# Patient Record
Sex: MALE | Race: WHITE | Employment: OTHER | URBAN - NONMETROPOLITAN AREA
[De-identification: names, ages, dates, MRNs, and addresses within clinical notes are randomized per-mention and may not be internally consistent; named-entity substitution may affect disease eponyms.]

---

## 2017-06-03 ENCOUNTER — NURSE TRIAGE (OUTPATIENT)
Dept: ADMINISTRATIVE | Age: 67
End: 2017-06-03

## 2017-08-24 ENCOUNTER — HOSPITAL ENCOUNTER (EMERGENCY)
Age: 67
Discharge: HOME | End: 2017-08-24
Attending: EMERGENCY MEDICINE
Payer: MEDICARE

## 2017-08-24 VITALS
OXYGEN SATURATION: 100 % | DIASTOLIC BLOOD PRESSURE: 76 MMHG | WEIGHT: 155 LBS | RESPIRATION RATE: 16 BRPM | HEART RATE: 68 BPM | SYSTOLIC BLOOD PRESSURE: 138 MMHG | BODY MASS INDEX: 22.89 KG/M2 | TEMPERATURE: 98.4 F

## 2017-08-24 DIAGNOSIS — L25.9 CONTACT DERMATITIS AND OTHER ECZEMA, DUE TO UNSPECIFIED CAUSE: Primary | ICD-10-CM
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
## 2017-08-24 PROCEDURE — 99213 OFFICE O/P EST LOW 20 MIN: CPT | Performed by: NURSE PRACTITIONER

## 2017-08-24 PROCEDURE — 99282 EMERGENCY DEPT VISIT SF MDM: CPT

## 2017-08-24 PROCEDURE — 99214 OFFICE O/P EST MOD 30 MIN: CPT

## 2017-08-24 RX ORDER — SULFAMETHOXAZOLE AND TRIMETHOPRIM 800; 160 MG/1; MG/1
1 TABLET ORAL 2 TIMES DAILY
Qty: 14 TABLET | Refills: 0 | Status: SHIPPED | OUTPATIENT
Start: 2017-08-24 | End: 2017-08-31

## 2017-08-24 RX ORDER — CEPHALEXIN 500 MG/1
500 CAPSULE ORAL 2 TIMES DAILY
Qty: 14 CAPSULE | Refills: 0 | Status: SHIPPED | OUTPATIENT
Start: 2017-08-24 | End: 2017-08-31

## 2017-08-24 ASSESSMENT — ENCOUNTER SYMPTOMS
COUGH: 0
SHORTNESS OF BREATH: 0
VOMITING: 0
CHEST TIGHTNESS: 0
NAUSEA: 0
DIARRHEA: 0
ABDOMINAL PAIN: 0

## 2017-08-24 ASSESSMENT — PAIN SCALES - GENERAL: PAINLEVEL_OUTOF10: 3

## 2017-08-24 ASSESSMENT — PAIN DESCRIPTION - FREQUENCY: FREQUENCY: CONTINUOUS

## 2017-08-24 ASSESSMENT — PAIN DESCRIPTION - PAIN TYPE: TYPE: ACUTE PAIN

## 2017-08-24 ASSESSMENT — PAIN DESCRIPTION - ORIENTATION: ORIENTATION: RIGHT;LOWER

## 2017-08-24 ASSESSMENT — PAIN DESCRIPTION - DESCRIPTORS: DESCRIPTORS: ACHING

## 2017-08-24 ASSESSMENT — PAIN DESCRIPTION - LOCATION: LOCATION: ARM

## 2017-12-09 ENCOUNTER — HOSPITAL ENCOUNTER (OUTPATIENT)
Age: 67
Setting detail: OBSERVATION
Discharge: HOME | End: 2017-12-10
Attending: EMERGENCY MEDICINE | Admitting: OPHTHALMOLOGY
Payer: MEDICARE

## 2017-12-09 ENCOUNTER — APPOINTMENT (OUTPATIENT)
Dept: CT IMAGING | Age: 67
End: 2017-12-09
Payer: MEDICARE

## 2017-12-09 DIAGNOSIS — K92.2 GASTROINTESTINAL HEMORRHAGE, UNSPECIFIED GASTROINTESTINAL HEMORRHAGE TYPE: Primary | ICD-10-CM

## 2017-12-09 DIAGNOSIS — R19.7 BLOODY DIARRHEA: ICD-10-CM

## 2017-12-09 DIAGNOSIS — K52.9 GASTROENTERITIS: ICD-10-CM

## 2017-12-09 PROBLEM — A04.72 C. DIFFICILE ENTERITIS: Status: ACTIVE | Noted: 2017-12-09

## 2017-12-09 LAB
ADENOVIRUS F 40 41 PCR: NOT DETECTED
ALBUMIN SERPL-MCNC: 5 G/DL (ref 3.5–5.1)
ALP BLD-CCNC: 77 U/L (ref 38–126)
ALT SERPL-CCNC: 24 U/L (ref 11–66)
ANION GAP SERPL CALCULATED.3IONS-SCNC: 15 MEQ/L (ref 8–16)
AST SERPL-CCNC: 34 U/L (ref 5–40)
ASTROVIRUS PCR: NOT DETECTED
BASOPHILS # BLD: 0.3 %
BASOPHILS ABSOLUTE: 0 THOU/MM3 (ref 0–0.1)
BILIRUB SERPL-MCNC: 0.4 MG/DL (ref 0.3–1.2)
BILIRUBIN URINE: NEGATIVE
BLOOD, URINE: NEGATIVE
BUN BLDV-MCNC: 14 MG/DL (ref 7–22)
CALCIUM SERPL-MCNC: 10 MG/DL (ref 8.5–10.5)
CAMPYLOBACTER PCR: NOT DETECTED
CHARACTER, URINE: CLEAR
CHLORIDE BLD-SCNC: 100 MEQ/L (ref 98–111)
CLOSTRIDIUM DIFFICILE, PCR: DETECTED
CO2: 27 MEQ/L (ref 23–33)
COLOR: YELLOW
CREAT SERPL-MCNC: 0.8 MG/DL (ref 0.4–1.2)
CRYPTOSPORIDIUM PCR: NOT DETECTED
CYCLOSPORA CAYETANENSIS PCR: NOT DETECTED
E COLI 0157 PCR: ABNORMAL
E COLI ENTEROAGGREGATIVE PCR: NOT DETECTED
E COLI ENTEROPATHOGENIC PCR: NOT DETECTED
E COLI ENTEROTOXIGENIC PCR: NOT DETECTED
E COLI SHIGA LIKE TOXIN PCR: NOT DETECTED
E COLI SHIGELLA/ENTEROINVASIVE PCR: NOT DETECTED
E HISTOLYTICA GI FILM ARRAY: NOT DETECTED
EOSINOPHIL # BLD: 0.8 %
EOSINOPHILS ABSOLUTE: 0.1 THOU/MM3 (ref 0–0.4)
FECAL LEUKOCYTES: NORMAL
GFR SERPL CREATININE-BSD FRML MDRD: > 90 ML/MIN/1.73M2
GIARDIA LAMBLIA PCR: NOT DETECTED
GLUCOSE BLD-MCNC: 82 MG/DL (ref 70–108)
GLUCOSE URINE: NEGATIVE MG/DL
HCT VFR BLD CALC: 38.6 % (ref 42–52)
HCT VFR BLD CALC: 44.7 % (ref 42–52)
HEMOGLOBIN: 13 GM/DL (ref 14–18)
HEMOGLOBIN: 15 GM/DL (ref 14–18)
KETONES, URINE: NEGATIVE
LEUKOCYTE ESTERASE, URINE: NEGATIVE
LIPASE: 20.4 U/L (ref 5.6–51.3)
LYMPHOCYTES # BLD: 13.6 %
LYMPHOCYTES ABSOLUTE: 1.4 THOU/MM3 (ref 1–4.8)
MCH RBC QN AUTO: 30.4 PG (ref 27–31)
MCHC RBC AUTO-ENTMCNC: 33.7 GM/DL (ref 33–37)
MCV RBC AUTO: 90.3 FL (ref 80–94)
MONOCYTES # BLD: 6.6 %
MONOCYTES ABSOLUTE: 0.7 THOU/MM3 (ref 0.4–1.3)
NITRITE, URINE: NEGATIVE
NOROVIRUS GI GII PCR: NOT DETECTED
NUCLEATED RED BLOOD CELLS: 0 /100 WBC
OSMOLALITY CALCULATION: 282.7 MOSMOL/KG (ref 275–300)
PDW BLD-RTO: 13.3 % (ref 11.5–14.5)
PH UA: 6
PLATELET # BLD: 316 THOU/MM3 (ref 130–400)
PLESIOMONAS SHIGELLOIDES PCR: NOT DETECTED
PMV BLD AUTO: 7.8 MCM (ref 7.4–10.4)
POTASSIUM SERPL-SCNC: 4.4 MEQ/L (ref 3.5–5.2)
PROTEIN UA: NEGATIVE
RBC # BLD: 4.94 MILL/MM3 (ref 4.7–6.1)
ROTAVIRUS A PCR: NOT DETECTED
SALMONELLA PCR: NOT DETECTED
SAPOVIRUS PCR: NOT DETECTED
SEG NEUTROPHILS: 78.7 %
SEGMENTED NEUTROPHILS ABSOLUTE COUNT: 7.9 THOU/MM3 (ref 1.8–7.7)
SODIUM BLD-SCNC: 142 MEQ/L (ref 135–145)
SPECIFIC GRAVITY, URINE: 1.01 (ref 1–1.03)
TOTAL PROTEIN: 7.6 G/DL (ref 6.1–8)
UROBILINOGEN, URINE: 0.2 EU/DL
VIBRIO CHOLERAE PCR: NOT DETECTED
VIBRIO PCR: NOT DETECTED
WBC # BLD: 10 THOU/MM3 (ref 4.8–10.8)
YERSINIA ENTEROCOLITICA PCR: NOT DETECTED

## 2017-12-09 PROCEDURE — G0378 HOSPITAL OBSERVATION PER HR: HCPCS

## 2017-12-09 PROCEDURE — 87507 IADNA-DNA/RNA PROBE TQ 12-25: CPT

## 2017-12-09 PROCEDURE — 89055 LEUKOCYTE ASSESSMENT FECAL: CPT

## 2017-12-09 PROCEDURE — 83690 ASSAY OF LIPASE: CPT

## 2017-12-09 PROCEDURE — 6370000000 HC RX 637 (ALT 250 FOR IP): Performed by: OPHTHALMOLOGY

## 2017-12-09 PROCEDURE — 99219 PR INITIAL OBSERVATION CARE/DAY 50 MINUTES: CPT | Performed by: OPHTHALMOLOGY

## 2017-12-09 PROCEDURE — 2580000003 HC RX 258: Performed by: OPHTHALMOLOGY

## 2017-12-09 PROCEDURE — 81003 URINALYSIS AUTO W/O SCOPE: CPT

## 2017-12-09 PROCEDURE — 6360000004 HC RX CONTRAST MEDICATION: Performed by: EMERGENCY MEDICINE

## 2017-12-09 PROCEDURE — 80053 COMPREHEN METABOLIC PANEL: CPT

## 2017-12-09 PROCEDURE — 85014 HEMATOCRIT: CPT

## 2017-12-09 PROCEDURE — 36415 COLL VENOUS BLD VENIPUNCTURE: CPT

## 2017-12-09 PROCEDURE — 85018 HEMOGLOBIN: CPT

## 2017-12-09 PROCEDURE — 85025 COMPLETE CBC W/AUTO DIFF WBC: CPT

## 2017-12-09 PROCEDURE — 74177 CT ABD & PELVIS W/CONTRAST: CPT

## 2017-12-09 PROCEDURE — 99285 EMERGENCY DEPT VISIT HI MDM: CPT

## 2017-12-09 RX ORDER — ONDANSETRON 2 MG/ML
4 INJECTION INTRAMUSCULAR; INTRAVENOUS EVERY 6 HOURS PRN
Status: DISCONTINUED | OUTPATIENT
Start: 2017-12-09 | End: 2017-12-10 | Stop reason: HOSPADM

## 2017-12-09 RX ORDER — SODIUM CHLORIDE 9 MG/ML
INJECTION, SOLUTION INTRAVENOUS CONTINUOUS
Status: DISCONTINUED | OUTPATIENT
Start: 2017-12-09 | End: 2017-12-09

## 2017-12-09 RX ORDER — FINASTERIDE 5 MG/1
5 TABLET, FILM COATED ORAL DAILY
Status: DISCONTINUED | OUTPATIENT
Start: 2017-12-09 | End: 2017-12-10 | Stop reason: HOSPADM

## 2017-12-09 RX ORDER — METRONIDAZOLE 500 MG/1
500 TABLET ORAL EVERY 8 HOURS
Status: DISCONTINUED | OUTPATIENT
Start: 2017-12-09 | End: 2017-12-10 | Stop reason: HOSPADM

## 2017-12-09 RX ORDER — SODIUM CHLORIDE 9 MG/ML
INJECTION, SOLUTION INTRAVENOUS CONTINUOUS
Status: DISCONTINUED | OUTPATIENT
Start: 2017-12-09 | End: 2017-12-10

## 2017-12-09 RX ADMIN — IOPAMIDOL 80 ML: 755 INJECTION, SOLUTION INTRAVENOUS at 14:31

## 2017-12-09 RX ADMIN — METRONIDAZOLE 500 MG: 500 TABLET, FILM COATED ORAL at 17:38

## 2017-12-09 RX ADMIN — SODIUM CHLORIDE: 9 INJECTION, SOLUTION INTRAVENOUS at 17:39

## 2017-12-09 ASSESSMENT — ENCOUNTER SYMPTOMS
DIARRHEA: 1
EYE DISCHARGE: 0
ABDOMINAL DISTENTION: 0
WHEEZING: 0
BLOOD IN STOOL: 1
COUGH: 0
ABDOMINAL PAIN: 1
SHORTNESS OF BREATH: 0
RHINORRHEA: 0
VOMITING: 0
NAUSEA: 0
EYE ITCHING: 0

## 2017-12-09 ASSESSMENT — PAIN SCALES - GENERAL
PAINLEVEL_OUTOF10: 0

## 2017-12-09 NOTE — ED PROVIDER NOTES
[facility]  eMERGENCY dEPARTMENT eNCOUnter          CHIEF COMPLAINT       Chief Complaint   Patient presents with    Rectal Bleeding       Nurses Notes reviewed and I agree except as noted in the HPI. HISTORY OF PRESENT ILLNESS    Lor Cunningham is a 75 y.o. male who presents to ED because of the bloody diarrhea, abdominal cramps and tenesmus in past three days. Three days ago on 12/5/2017, patient said he ate some room temperature left-over turkey sandwich. The second day he also ate some scrambled egg made from fresh farm eggs then developed mild diarrhea with tenesmus sensation. His diarrhea was mixed with small blood but diarrhea improved later. Yesterday patient noticed increasing bloody diarrhea, in the past 24 hours, he had multiple episodes of hematochezia. He complains intermittent periumbilical cramping. He had significant tenesmus sensation. He complains of mild lower back pain. He has no fever or chills. He has no nausea vomiting. No decreased urination. No leg swelling    Prior abdominal surgeries include hernia repair. REVIEW OF SYSTEMS     Review of Systems   Constitutional: Negative for activity change, appetite change, chills, fatigue and fever. HENT: Negative for congestion, ear discharge, hearing loss, nosebleeds and rhinorrhea. Eyes: Negative for discharge and itching. Respiratory: Negative for cough, shortness of breath and wheezing. Cardiovascular: Negative for chest pain. Gastrointestinal: Positive for abdominal pain, blood in stool and diarrhea. Negative for abdominal distention, nausea and vomiting. See HPI for details. Endocrine: Negative for cold intolerance. Musculoskeletal: Negative for arthralgias, myalgias, neck pain and neck stiffness. Skin: Negative for rash and wound. Neurological: Negative for dizziness and weakness. Psychiatric/Behavioral: Negative for agitation and suicidal ideas.           PAST MEDICAL HISTORY deviation present. Cardiovascular: Normal rate, regular rhythm and normal heart sounds. Exam reveals no gallop and no friction rub. No murmur heard. Pulmonary/Chest: Effort normal. No stridor. No respiratory distress. He has no wheezes. Abdominal: Soft. There is no tenderness. There is no rebound and no guarding. Mild periumbilical discomfort on palpation, no obvious tenderness. No guarding or rebound. Hyperactive bowel sounds. Musculoskeletal: He exhibits no edema or tenderness. Neurological: He is alert and oriented to person, place, and time. No cranial nerve deficit. Coordination normal.   Skin: Skin is warm and dry. He is not diaphoretic. Psychiatric: He has a normal mood and affect. His behavior is normal. Judgment and thought content normal.         DIFFERENTIAL DIAGNOSIS:   Gastroenteritis, C-diff colitis, food poisoning, Shigella, Salmonella, Pathogenic Escherichia coli infection, dehydration    DIAGNOSTIC RESULTS     EKG: All EKG's are interpreted by the Emergency Department Physician who either signs or Co-signs this chart in the absence of a cardiologist.  None    RADIOLOGY: non-plain film images(s) such as CT, Ultrasound and MRI are read by the radiologist.  CT ABDOMEN PELVIS W IV CONTRAST Additional Contrast? None   Final Result       No evidence of an acute process in the abdomen or pelvis. **This report has been created using voice recognition software. It may contain minor errors which are inherent in voice recognition technology. **      Final report electronically signed by Dr. Román Clifford on 12/9/2017 2:44 PM          [] Visualized and interpreted by me   [] Radiologist's Wet Read Report Reviewed   [] Discussed with Radiologist.    LABS:   Labs Reviewed   CBC WITH AUTO DIFFERENTIAL - Abnormal; Notable for the following:        Result Value    Segs Absolute 7.9 (*)     All other components within normal limits   FECAL LEUKOCYTES    Narrative:     Source: feces MD Tiff Suárez MD  12/09/17 8237

## 2017-12-09 NOTE — H&P
History & Physical        Patient:  Мария Bustamante  YOB: 1950    MRN: [id number]   Acct:  [de-identified]   Primary Care Physician: Queen Margarita MD       Chief Complaint:  Bloody diarrhea    History of Present Illness:   History obtained from chart review and the patient. The patient is a 75 y.o. male who presented to [address] [place] [address] with bloody stool. He reported loose bloody stool. Once a day. Was assciated with nausea: no vomiting and cramping when started on Wed. 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 Past Medical History:        Diagnosis Date    Bowel obstruction     Colon disorder 2009    blocked bowel       Past Surgical History:        Procedure Laterality Date    COLONOSCOPY      HERNIA REPAIR  2009       Home Medications:    Prior to Admission medications    Medication Sig Start Date End Date Taking? Authorizing Provider   Garlic 461 MG CAPS Take by mouth   Yes Historical Provider, MD   Capsicum, Cayenne, (CAYENNE PEPPER PO) Take by mouth   Yes Historical Provider, MD   Coenzyme Q10 (COQ-10 PO) Take by mouth   Yes Historical Provider, MD   OLIVE LEAF EXTRACT PO Take by mouth   Yes Historical Provider, MD   RED YEAST RICE Take  by mouth daily. Historical Provider, MD   finasteride (PROSCAR) 5 MG tablet Take 1 tablet by mouth daily. 10/24/11   Matty Segura MD   UNABLE TO FIND daily. Calcium, magnesium, zinc supplement. Takes daily     Historical Provider, MD   Fish Oil-Cholecalciferol (OMEGA-3 FISH OIL/VITAMIN D3) 6752-7192 MG-UNIT CAPS Take  by mouth 2 times daily. Historical Provider, MD   aspirin 81 MG EC tablet Take 81 mg by mouth daily. Historical Provider, MD   CRANBERRY PO Take 2,000 mg by mouth daily. Historical Provider, MD   Multiple Vitamin (MULTI VITAMIN MENS PO) Take  by mouth daily.       Historical Provider, MD   Saw Norris-Phytosterols (PROSTATE SR normal S1/S2 without murmurs, rubs or gallops. Abdomen: Soft, non-tender, non-distended with normal bowel sounds. Musculoskeletal:  No clubbing, cyanosis or edema bilaterally. Full range of motion without deformity. Skin: Skin color, texture, turgor normal.  No rashes or lesions. Neurologic:  Neurovascularly intact without any focal sensory/motor deficits. Cranial nerves: II-XII intact, grossly non-focal.  Psychiatric:  Alert and oriented, thought content appropriate, normal insight  Capillary Refill: Brisk,< 3 seconds   Peripheral Pulses: +2 palpable, equal bilaterally     Review of Labs and Diagnostic Testing:  Labs:     Recent Labs      12/09/17   1322   WBC  10.0   HGB  15.0   HCT  44.7   PLT  316     Recent Labs      12/09/17   1322   NA  142   K  4.4   CL  100   CO2  27   BUN  14   CREATININE  0.8   CALCIUM  10.0     Recent Labs      12/09/17   1322   AST  34   ALT  24   BILITOT  0.4   ALKPHOS  77      Urinalysis:   Lab Results   Component Value Date    NITRU NEGATIVE 12/09/2017    BLOODU NEGATIVE 12/09/2017    Judith [place] 994 NEGATIVE 12/09/2017     Radiology:   Reviewed: see report for details  CXR: I have reviewed the report  EKG:  No acute changes:   CT ABDOMEN PELVIS W IV CONTRAST Additional Contrast? None   Final Result       No evidence of an acute process in the abdomen or pelvis. **This report has been created using voice recognition software. It may contain minor errors which are inherent in voice recognition technology. **      Final report electronically signed by Dr. Aleta Beltran on 12/9/2017 2:44 PM          Code Status: No Order    Assessment/Plan:  Active Problems:    Bloody diarrhea    C. difficile enteritis    Lower GI bleed     start flagyl, Serial H/H  Consult GI  Plan to discharge tomorrow      PT/OT Eval Status:D/C home  DVT prophylaxis: [] Lovenox                                 [x] SCDs                                 [] SQ Heparin                                 []

## 2017-12-09 NOTE — ED TRIAGE NOTES
Patient presents to ED for rectal bleeding. States Wednesday night he began having dry heaving and bloody diarrhea. Patient states Tuesday he had had a turkey sandwich that sat out at room temperature for awhile. States Wednesday night he had farm fresh eggs and an hour later the dry heaving and chills began. Patient states he was diaphoretic at the time. Had a normal BM and then the bloody diarrhea started. Patient reports Thursday he was feeling better. Friday morning he was having bloody stools again. Patient denies any pain at this time but notes a feeling of fullness. Skin warm and dry. Respirations easy and unlabored.

## 2017-12-09 NOTE — ED NOTES
Patient transported to floor by cart. On Monitor. Nurse called at extension [phone] prior to transport.        Marylu Heredia LPN  50/52/32 1678

## 2017-12-10 VITALS
RESPIRATION RATE: 16 BRPM | DIASTOLIC BLOOD PRESSURE: 74 MMHG | BODY MASS INDEX: 22.19 KG/M2 | WEIGHT: 155 LBS | HEART RATE: 75 BPM | SYSTOLIC BLOOD PRESSURE: 127 MMHG | HEIGHT: 70 IN | OXYGEN SATURATION: 99 % | TEMPERATURE: 98 F

## 2017-12-10 LAB
ANION GAP SERPL CALCULATED.3IONS-SCNC: 13 MEQ/L (ref 8–16)
BUN BLDV-MCNC: 9 MG/DL (ref 7–22)
CALCIUM SERPL-MCNC: 9.6 MG/DL (ref 8.5–10.5)
CHLORIDE BLD-SCNC: 101 MEQ/L (ref 98–111)
CO2: 28 MEQ/L (ref 23–33)
CREAT SERPL-MCNC: 0.9 MG/DL (ref 0.4–1.2)
GFR SERPL CREATININE-BSD FRML MDRD: 84 ML/MIN/1.73M2
GLUCOSE BLD-MCNC: 87 MG/DL (ref 70–108)
HCT VFR BLD CALC: 43.5 % (ref 42–52)
HEMOGLOBIN: 14.9 GM/DL (ref 14–18)
MCH RBC QN AUTO: 31.2 PG (ref 27–31)
MCHC RBC AUTO-ENTMCNC: 34.2 GM/DL (ref 33–37)
MCV RBC AUTO: 91.1 FL (ref 80–94)
PDW BLD-RTO: 13 % (ref 11.5–14.5)
PLATELET # BLD: 287 THOU/MM3 (ref 130–400)
PMV BLD AUTO: 7.6 MCM (ref 7.4–10.4)
POTASSIUM SERPL-SCNC: 4.6 MEQ/L (ref 3.5–5.2)
RBC # BLD: 4.78 MILL/MM3 (ref 4.7–6.1)
SODIUM BLD-SCNC: 142 MEQ/L (ref 135–145)
WBC # BLD: 7.5 THOU/MM3 (ref 4.8–10.8)

## 2017-12-10 PROCEDURE — G0378 HOSPITAL OBSERVATION PER HR: HCPCS

## 2017-12-10 PROCEDURE — 80048 BASIC METABOLIC PNL TOTAL CA: CPT

## 2017-12-10 PROCEDURE — 36415 COLL VENOUS BLD VENIPUNCTURE: CPT

## 2017-12-10 PROCEDURE — 6370000000 HC RX 637 (ALT 250 FOR IP): Performed by: PHYSICIAN ASSISTANT

## 2017-12-10 PROCEDURE — 85027 COMPLETE CBC AUTOMATED: CPT

## 2017-12-10 PROCEDURE — 99217 PR OBSERVATION CARE DISCHARGE MANAGEMENT: CPT | Performed by: PHYSICIAN ASSISTANT

## 2017-12-10 PROCEDURE — 6370000000 HC RX 637 (ALT 250 FOR IP): Performed by: OPHTHALMOLOGY

## 2017-12-10 RX ORDER — METRONIDAZOLE 500 MG/1
500 TABLET ORAL EVERY 8 HOURS
Qty: 30 TABLET | Refills: 0 | Status: SHIPPED | OUTPATIENT
Start: 2017-12-10 | End: 2017-12-20

## 2017-12-10 RX ORDER — LACTOBACILLUS RHAMNOSUS GG 10B CELL
1 CAPSULE ORAL
Status: DISCONTINUED | OUTPATIENT
Start: 2017-12-10 | End: 2017-12-10 | Stop reason: HOSPADM

## 2017-12-10 RX ORDER — LACTOBACILLUS RHAMNOSUS GG 10B CELL
1 CAPSULE ORAL
Qty: 30 CAPSULE | Refills: 0 | Status: SHIPPED | OUTPATIENT
Start: 2017-12-11

## 2017-12-10 RX ADMIN — METRONIDAZOLE 500 MG: 500 TABLET, FILM COATED ORAL at 10:47

## 2017-12-10 RX ADMIN — Medication 1 CAPSULE: at 10:47

## 2017-12-10 RX ADMIN — METRONIDAZOLE 500 MG: 500 TABLET, FILM COATED ORAL at 00:00

## 2017-12-10 ASSESSMENT — PAIN SCALES - GENERAL
PAINLEVEL_OUTOF10: 0
PAINLEVEL_OUTOF10: 0

## 2017-12-10 NOTE — PLAN OF CARE
Problem:  Bowel/Gastric:  Goal: Occurrences of diarrhea will decrease  Occurrences of diarrhea will decrease  Outcome: Ongoing  Still loose stool today    Problem: Fluid Volume:  Goal: Will show no signs and symptoms of electrolyte imbalance  Will show no signs and symptoms of electrolyte imbalance  Outcome: Met This Shift  Monitoring labs within normal    Problem: Physical Regulation:  Goal: Prevent transmision of infection  Prevent transmision of infection  Outcome: Met This Shift  Isolation reviewed

## 2017-12-10 NOTE — PLAN OF CARE
Problem: Diarrhea:  Goal: Bowel elimination is within specified parameters  Bowel elimination is within specified parameters   Outcome: Ongoing  Pt remains w/ bloating, very active bowel sounds some mild cramping    Goal: Passage of soft, formed stool  Passage of soft, formed stool   Outcome: Ongoing    Goal: Establishment of normal bowel function will improve to within specified parameters  Establishment of normal bowel function will improve to within specified parameters   Outcome: Ongoing      Problem: Bowel/Gastric:  Goal: Occurrences of diarrhea will decrease  Occurrences of diarrhea will decrease   Outcome: Ongoing  No stools on nights or this am. Ptstates that he still feels bloated and some mild cramping. Problem: Fluid Volume:  Goal: Will show no signs and symptoms of electrolyte imbalance  Will show no signs and symptoms of electrolyte imbalance   Outcome: Ongoing  Labs are within normal range. Pt is drinking wel and keeping hydrated. Problem: Physical Regulation:  Goal: Prevent transmision of infection  Prevent transmision of infection   Outcome: Ongoing  Pt and wife have been talk on how to keep transmission from being spread to any where else. Goal: Ability to avoid or minimize complications of infection will improve  Ability to avoid or minimize complications of infection will improve   Outcome: Met This Shift    Goal: Signs and symptoms of infection will decrease  Signs and symptoms of infection will decrease   Outcome: Ongoing  Pt is on flagyl and lactobaccillus    Problem: Skin Integrity:  Goal: Risk for impaired skin integrity will decrease  Risk for impaired skin integrity will decrease   Outcome: Met This Shift  No new open areas noted on pt's skin.

## 2017-12-10 NOTE — PLAN OF CARE
Problem: Diarrhea:  Goal: Bowel elimination is within specified parameters  Bowel elimination is within specified parameters   Outcome: Ongoing  Patient denies having BM since ED. Patient states at that time stool was liquid, bloody, mucusy. Patient c/o bloating, gas, states cramping has subsided. Patient receiving flagyl. Will continue to monitor. Problem: Bowel/Gastric:  Goal: Occurrences of diarrhea will decrease  Occurrences of diarrhea will decrease   Outcome: Ongoing  Patient denies having BM since ED. Patient states at that time stool was liquid, bloody, mucusy. Patient c/o bloating, gas, states cramping has subsided. Patient receiving flagyl. Will continue to monitor. Problem: Fluid Volume:  Goal: Will show no signs and symptoms of electrolyte imbalance  Will show no signs and symptoms of electrolyte imbalance   Outcome: Met This Shift  Electrolytes WNL. VSS. Problem: Physical Regulation:  Goal: Prevent transmision of infection  Prevent transmision of infection   Outcome: Met This Shift  Patient in contact plus precautions, discussed with patient CDIF precautions - hand washing, bleach, etc. Contact plus precautions maintained. Problem: Skin Integrity:  Goal: Risk for impaired skin integrity will decrease  Risk for impaired skin integrity will decrease   Outcome: Met This Shift  Patient with no skin breakdown, turns self, pillow support. Will continue to monitor. Comments: Patient participated in plan of care and contributed to goal setting.

## 2017-12-10 NOTE — PROGRESS NOTES
normal S1/S2 without murmurs, rubs or gallops. Abdomen: Soft, non-tender, non-distended with normal bowel sounds. Non-tender. Active bowel sounds. Musculoskeletal: No clubbing, cyanosis or edema bilaterally. Full range of motion without deformity. Skin: Skin color, texture, turgor normal.  No rashes or lesions. Neurologic:  Neurovascularly intact without any focal sensory/motor deficits. Psychiatric: Alert and oriented  Capillary Refill: Brisk,< 3 seconds   Peripheral Pulses: +2 palpable, equal bilaterally       Labs:   Recent Labs      12/09/17   1322  12/09/17   2331  12/10/17   0818   WBC  10.0   --   7.5   HGB  15.0  13.0*  14.9   HCT  44.7  38.6*  43.5   PLT  316   --   287     Recent Labs      12/09/17   1322  12/10/17   0818   NA  142  142   K  4.4  4.6   CL  100  101   CO2  27  28   BUN  14  9   CREATININE  0.8  0.9   CALCIUM  10.0  9.6     Recent Labs      12/09/17   1322   AST  34   ALT  24   BILITOT  0.4   ALKPHOS  77       Urinalysis:      Lab Results   Component Value Date    NITRU NEGATIVE 12/09/2017    BLOODU NEGATIVE 12/09/2017    GLUCOSEU NEGATIVE 12/09/2017       Radiology:  CT ABDOMEN PELVIS W IV CONTRAST Additional Contrast? None   Final Result       No evidence of an acute process in the abdomen or pelvis. **This report has been created using voice recognition software. It may contain minor errors which are inherent in voice recognition technology. **      Final report electronically signed by Dr. Maikel Esteban on 12/9/2017 2:44 PM          Diet: DIET LOW FIBER;    DVT prophylaxis: [] Lovenox                                 [x] SCDs                                 [] SQ Heparin                                 [] Encourage ambulation           [] Already on Anticoagulation     Disposition:    [x] Home       [] TCU       [] Rehab       [] Psych       [] SNF       [] [place]       [] Other-    Code Status: Full Code      Assessment/Plan:    Anticipated medications  · lactobacillus capsule  · metroNIDAZOLE 500 MG tablet           I have discussed this patient's care and plan with Dr. Sesar Crooks   Electronically signed by Kristal Vernon PA-C on 12/10/2017 at 4:57 PM